# Patient Record
Sex: FEMALE | Race: AMERICAN INDIAN OR ALASKA NATIVE
[De-identification: names, ages, dates, MRNs, and addresses within clinical notes are randomized per-mention and may not be internally consistent; named-entity substitution may affect disease eponyms.]

---

## 2022-06-16 ENCOUNTER — HOSPITAL ENCOUNTER (OUTPATIENT)
Dept: HOSPITAL 5 - FLUORO | Age: 47
Discharge: HOME | End: 2022-06-16
Attending: SURGERY
Payer: MEDICAID

## 2022-06-16 DIAGNOSIS — E66.01: ICD-10-CM

## 2022-06-16 DIAGNOSIS — K22.89: ICD-10-CM

## 2022-06-16 DIAGNOSIS — K30: ICD-10-CM

## 2022-06-16 DIAGNOSIS — Z01.818: Primary | ICD-10-CM

## 2022-06-16 PROCEDURE — 74220 X-RAY XM ESOPHAGUS 1CNTRST: CPT

## 2022-06-16 PROCEDURE — 93017 CV STRESS TEST TRACING ONLY: CPT

## 2022-06-16 NOTE — FLUOROSCOPY REPORT
BARIUM SWALLOW



Indication:  E66.01 MORBID OBESITY.



Technique:  Single and double  contrast barium technique utilized to evaluate the esophagus.  



FINDINGS:  To begin the exam, swallowing was evaluated in the lateral position under direct fluorosco
py.  Swallowing was normal.



No mucosal irregularity, mass, mass effect, or critical stenosis.   Mild to moderate tertiary contrac
tions were witnessed in the mid to distal esophagus with to and fro flow of contrast agent and delaye
d emptying of the esophagus. No hiatal hernia or gastroesophageal reflux was witnessed.



IMPRESSION:  Esophageal dysmotility as described.



Fluoroscopic time: 1.5 minutes



Number of fluoroscopic images:  52



Signer Name: Geovanny Garland Jr, MD 

Signed: 6/16/2022 12:18 PM

Workstation Name: [id number]

## 2025-01-15 NOTE — TREADMILL REPORT
DATE OF SERVICE: 06/16/2022



TREADMILL STRESS TEST



REFERRING PHYSICIAN:  Dr. Aguirre.



INDICATION FOR PROCEDURE:  Preop for bariatric procedure.



PROCEDURE IN DETAIL:  The patient was brought to the stress lab in a 

postabsorptive state and exercised under standard Celso protocol treadmill.  

Resting heart rate is 72.  Resting EKG without any acute ST segment changes, 

unremarkable.  Resting blood pressure is 130/82.

The patient exercised for a total of 6 minutes on a standard Celso protocol to 

achieve a target heart rate.  Peak heart rate is 170.  No ST changes, 

arrhythmias, or chest pain during stress or recovery.  Peak blood pressure is 

182/70.



CONCLUSIONS:

1.  Normal exercise treadmill stress test without evidence of diagnostic ST 

changes, arrhythmias, or chest pain during stress or recovery.

2.  Appropriate heart rate/blood pressure response in recovery.







DD: 06/16/2022 10:01 AM

DT: 06/17/2022 01:49 AM

TID: [id number] RECEIPT: [id number]

KIRAN/AUDREY
[facility]

                                       

Test Date:    2022               Test Time:    09:41:00

Pat Name:     KIM DAVIS            Department:   

Patient ID:   [id number]          Room:          

Gender:       F                        Technician:   Radha Feliciano

:          [id number]               Requested By: MYRIAM LITTLE

Order Number: [id number]              Reading MD:   Silas Murrell

                           Interpretive Statements





Electronically Signed On 2022 11:24:59 EDT by Silas Murrell
ortho